# Patient Record
Sex: FEMALE | Race: WHITE | NOT HISPANIC OR LATINO | Employment: OTHER
[De-identification: names, ages, dates, MRNs, and addresses within clinical notes are randomized per-mention and may not be internally consistent; named-entity substitution may affect disease eponyms.]

---

## 2017-12-03 ENCOUNTER — HEALTH MAINTENANCE LETTER (OUTPATIENT)
Age: 47
End: 2017-12-03

## 2020-03-02 ENCOUNTER — HEALTH MAINTENANCE LETTER (OUTPATIENT)
Age: 50
End: 2020-03-02

## 2020-12-20 ENCOUNTER — HEALTH MAINTENANCE LETTER (OUTPATIENT)
Age: 50
End: 2020-12-20

## 2021-02-28 ENCOUNTER — HEALTH MAINTENANCE LETTER (OUTPATIENT)
Age: 51
End: 2021-02-28

## 2021-04-18 ENCOUNTER — HEALTH MAINTENANCE LETTER (OUTPATIENT)
Age: 51
End: 2021-04-18

## 2021-10-03 ENCOUNTER — HEALTH MAINTENANCE LETTER (OUTPATIENT)
Age: 51
End: 2021-10-03

## 2022-03-20 ENCOUNTER — HEALTH MAINTENANCE LETTER (OUTPATIENT)
Age: 52
End: 2022-03-20

## 2022-05-15 ENCOUNTER — HEALTH MAINTENANCE LETTER (OUTPATIENT)
Age: 52
End: 2022-05-15

## 2022-09-10 ENCOUNTER — HEALTH MAINTENANCE LETTER (OUTPATIENT)
Age: 52
End: 2022-09-10

## 2022-09-28 ENCOUNTER — HOSPITAL ENCOUNTER (EMERGENCY)
Facility: OTHER | Age: 52
Discharge: HOME | End: 2022-09-28
Attending: PHYSICIAN ASSISTANT | Admitting: PHYSICIAN ASSISTANT
Payer: OTHER GOVERNMENT

## 2022-09-28 VITALS
WEIGHT: 200 LBS | RESPIRATION RATE: 18 BRPM | HEIGHT: 68 IN | HEART RATE: 72 BPM | OXYGEN SATURATION: 99 % | TEMPERATURE: 97.3 F | SYSTOLIC BLOOD PRESSURE: 171 MMHG | BODY MASS INDEX: 30.31 KG/M2 | DIASTOLIC BLOOD PRESSURE: 96 MMHG

## 2022-09-28 DIAGNOSIS — S05.00XA CORNEAL ABRASION: ICD-10-CM

## 2022-09-28 PROCEDURE — 99283 EMERGENCY DEPT VISIT LOW MDM: CPT | Performed by: PHYSICIAN ASSISTANT

## 2022-09-28 PROCEDURE — 250N000009 HC RX 250: Performed by: PHYSICIAN ASSISTANT

## 2022-09-28 RX ORDER — TETRACAINE HYDROCHLORIDE 5 MG/ML
1-2 SOLUTION OPHTHALMIC ONCE
Status: COMPLETED | OUTPATIENT
Start: 2022-09-28 | End: 2022-09-28

## 2022-09-28 RX ORDER — ERYTHROMYCIN 5 MG/G
OINTMENT OPHTHALMIC ONCE
Status: COMPLETED | OUTPATIENT
Start: 2022-09-28 | End: 2022-09-28

## 2022-09-28 RX ORDER — ERYTHROMYCIN 5 MG/G
0.5 OINTMENT OPHTHALMIC 4 TIMES DAILY
Qty: 3.5 G | Refills: 0 | Status: SHIPPED | OUTPATIENT
Start: 2022-09-28

## 2022-09-28 RX ADMIN — ERYTHROMYCIN 1 G: 5 OINTMENT OPHTHALMIC at 22:07

## 2022-09-28 RX ADMIN — FLUORESCEIN SODIUM 1 STRIP: 1 STRIP OPHTHALMIC at 21:41

## 2022-09-28 RX ADMIN — TETRACAINE HYDROCHLORIDE 1 DROP: 5 SOLUTION OPHTHALMIC at 21:41

## 2022-09-28 ASSESSMENT — ENCOUNTER SYMPTOMS
VOMITING: 0
EYE PAIN: 1
DYSURIA: 0
EYE REDNESS: 1
ABDOMINAL PAIN: 0
FEVER: 0
CONFUSION: 0
SHORTNESS OF BREATH: 0
NAUSEA: 0

## 2022-09-29 NOTE — ED PROVIDER NOTES
History     Chief Complaint   Patient presents with     Eye Injury     HPI  Judi Quintanilla is a 51 year old female who presents to the ED for evaluation of eye injury. patient arrives from with complaints of left eye pain.  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 Happened 1230 today and pain has not decreased.  eye feels very sore and little blurry. Difficulty opening eye.    Allergies:  Allergies   Allergen Reactions     Zithromax [Azithromycin] Nausea and Vomiting       Problem List:    There are no problems to display for this patient.       Past Medical History:    History reviewed. No pertinent past medical history.    Past Surgical History:    Past Surgical History:   Procedure Laterality Date     HEAD & NECK SURGERY      discs replaced in neck     THORACIC SURGERY      R lung mass removed       Family History:    History reviewed. No pertinent family history.    Social History:  Marital Status:   [2]  Social History     Tobacco Use     Smoking status: Never Smoker     Smokeless tobacco: Never Used   Substance Use Topics     Alcohol use: No     Drug use: No        Medications:    erythromycin (ROMYCIN) 5 MG/GM ophthalmic ointment  calcium carbonate (OS-MARCEL 500 MG [place] [place]) 500 MG tablet  Ferrous Sulfate (IRON SUPPLEMENT PO)  fluticasone (FLONASE) 50 MCG/ACT nasal spray  GLUCOSAMINE SULFATE PO  HYDROcodone-acetaminophen 5-325 MG per tablet  loratadine (CLARITIN) 10 MG tablet  multivitamin, therapeutic with minerals (MULTI-VITAMIN) TABS  Pramipexole Dihydrochloride (MIRAPEX PO)          Review of Systems   Constitutional: Negative for fever.   HENT: Negative for congestion.    Eyes: Positive for pain and redness. Negative for visual disturbance.   Respiratory: Negative for shortness of breath.    Cardiovascular: Negative for chest pain.   Gastrointestinal: Negative for abdominal pain, nausea and vomiting.   Genitourinary:  "Negative for dysuria.   Psychiatric/Behavioral: Negative for confusion.       Physical Exam   BP: (!) 171/96  Pulse: 72  Temp: 97.3  F (36.3  C)  Resp: 18  Height: 172.7 cm (5' 8\")  Weight: 90.7 kg (200 lb)  SpO2: 99 %      Physical Exam  Constitutional:       General: She is not in acute distress.     Appearance: She is well-developed. She is not diaphoretic.   HENT:      Head: Normocephalic and atraumatic.   Eyes:      General: No scleral icterus.     Extraocular Movements: Extraocular movements intact.      Conjunctiva/sclera: Conjunctivae normal.      Pupils: Pupils are equal, round, and reactive to light.      Comments: Painful, red, right eye.    Pulmonary:      Effort: Pulmonary effort is normal.   Musculoskeletal:         General: No deformity.      Cervical back: Neck supple.   Lymphadenopathy:      Cervical: No cervical adenopathy.   Skin:     General: Skin is warm and dry.      Coloration: Skin is not jaundiced.      Findings: No rash.   Neurological:      Mental Status: She is alert and oriented to person, place, and time. Mental status is at baseline.   Psychiatric:         Mood and Affect: Mood normal.         Behavior: Behavior normal.         ED Course                 Procedures              Critical Care time:  none               No results found for this or any previous visit (from the past 24 hour(s)).    Medications   tetracaine (PONTOCAINE) 0.5 % ophthalmic solution 1-2 drop (1 drop Both Eyes Given 9/28/22 2141)   fluorescein (FUL-KARSTEN) ophthalmic strip 1 strip (1 strip Both Eyes Given 9/28/22 2141)   erythromycin (ROMYCIN) ophthalmic ointment (1 g Left Eye Given 9/28/22 2207)       Assessments & Plan (with Medical Decision Making)   Nontoxic and in NAD. She does have a painful, red, right eye. PERRL. Full ROM eyes. No obvious sign of FB. Relief with tetracaine drops and able to open eyelid without difficulty.  I examined with some fluorescein dye.  She did appear to have corneal abrasion/scratch " and horizontal plane lying over pupil and iris. negative Stephanie sign. I was able to slightly flip and see underside of eyelids, no FB present. We will send her home on erythromycin ointment.  And encourage her to have close follow-up with ophthalmology for close reassessment.    Strict return precautions are given to the pt, they will return if symptoms are worsening or concerning. The pt understands and agrees with the plan and they are discharged.     Miguelito Turk PA-C    I have reviewed the nursing notes.    I have reviewed the findings, diagnosis, plan and need for follow up with the patient.       Discharge Medication List as of 9/28/2022  9:54 PM      START taking these medications    Details   erythromycin (ROMYCIN) 5 MG/GM ophthalmic ointment Place 0.5 inches Into the left eye 4 times dailyDisp-3.5 g, [id number]             Final diagnoses:   Corneal abrasion       9/28/2022   [facility]     Miguelito Turk PA  09/28/22 0753

## 2022-09-29 NOTE — ED TRIAGE NOTES
patient arrives from with complaints of left eye pain. States was trimming a clients toe nails at work and one flew up and hit her in eye and has had pain ever since. Happened 1230 today and pain has not decreased.  eye feels very sore and little blurry. Difficulty opening eye      Triage Assessment     Row Name 09/28/22 1957       Triage Assessment (Adult)    Airway WDL WDL       Respiratory WDL    Respiratory WDL WDL       Skin Circulation/Temperature WDL    Skin Circulation/Temperature WDL WDL       Cardiac WDL    Cardiac WDL WDL       Peripheral/Neurovascular WDL    Peripheral Neurovascular WDL WDL       Cognitive/Neuro/Behavioral WDL    Cognitive/Neuro/Behavioral WDL WDL

## 2022-09-29 NOTE — DISCHARGE INSTRUCTIONS
Get plenty of fluids and rest.  As discussed, it does appear that you have an abrasion across your cornea that overlies pupil/iris.  We will try the erythromycin ointment, if you have any adverse reactions to that please let me know.  I do recommend that she follow-up with ophthalmology within the next 1 to 2 days for close reassessment.  Return if there are worsening or concerning symptoms.

## 2023-04-30 ENCOUNTER — HEALTH MAINTENANCE LETTER (OUTPATIENT)
Age: 53
End: 2023-04-30

## 2023-06-03 ENCOUNTER — HEALTH MAINTENANCE LETTER (OUTPATIENT)
Age: 53
End: 2023-06-03

## 2024-07-07 ENCOUNTER — HEALTH MAINTENANCE LETTER (OUTPATIENT)
Age: 54
End: 2024-07-07

## 2025-05-11 ENCOUNTER — HEALTH MAINTENANCE LETTER (OUTPATIENT)
Age: 55
End: 2025-05-11

## 2025-07-13 ENCOUNTER — HEALTH MAINTENANCE LETTER (OUTPATIENT)
Age: 55
End: 2025-07-13

## (undated) RX ORDER — ERYTHROMYCIN 5 MG/G
OINTMENT OPHTHALMIC
Status: DISPENSED
Start: 2022-09-28

## (undated) RX ORDER — TETRACAINE HYDROCHLORIDE 5 MG/ML
SOLUTION OPHTHALMIC
Status: DISPENSED
Start: 2022-09-28